# Patient Record
Sex: MALE | NOT HISPANIC OR LATINO | URBAN - METROPOLITAN AREA
[De-identification: names, ages, dates, MRNs, and addresses within clinical notes are randomized per-mention and may not be internally consistent; named-entity substitution may affect disease eponyms.]

---

## 2023-12-08 PROCEDURE — 99284 EMERGENCY DEPT VISIT MOD MDM: CPT | Mod: 25

## 2023-12-08 PROCEDURE — 99283 EMERGENCY DEPT VISIT LOW MDM: CPT | Performed by: EMERGENCY MEDICINE

## 2023-12-08 ASSESSMENT — COLUMBIA-SUICIDE SEVERITY RATING SCALE - C-SSRS
1. IN THE PAST MONTH, HAVE YOU WISHED YOU WERE DEAD OR WISHED YOU COULD GO TO SLEEP AND NOT WAKE UP?: NO
2. HAVE YOU ACTUALLY HAD ANY THOUGHTS OF KILLING YOURSELF?: NO
6. HAVE YOU EVER DONE ANYTHING, STARTED TO DO ANYTHING, OR PREPARED TO DO ANYTHING TO END YOUR LIFE?: NO

## 2023-12-09 ENCOUNTER — HOSPITAL ENCOUNTER (EMERGENCY)
Facility: HOSPITAL | Age: 43
Discharge: HOME | End: 2023-12-09
Attending: EMERGENCY MEDICINE

## 2023-12-09 VITALS
TEMPERATURE: 98 F | BODY MASS INDEX: 49.5 KG/M2 | HEIGHT: 66 IN | RESPIRATION RATE: 18 BRPM | WEIGHT: 308 LBS | HEART RATE: 81 BPM | DIASTOLIC BLOOD PRESSURE: 98 MMHG | OXYGEN SATURATION: 97 % | SYSTOLIC BLOOD PRESSURE: 159 MMHG

## 2023-12-09 DIAGNOSIS — J45.21 MILD INTERMITTENT ASTHMA WITH ACUTE EXACERBATION (HHS-HCC): Primary | ICD-10-CM

## 2023-12-09 PROCEDURE — 2500000002 HC RX 250 W HCPCS SELF ADMINISTERED DRUGS (ALT 637 FOR MEDICARE OP, ALT 636 FOR OP/ED)

## 2023-12-09 PROCEDURE — 94640 AIRWAY INHALATION TREATMENT: CPT

## 2023-12-09 PROCEDURE — 2500000004 HC RX 250 GENERAL PHARMACY W/ HCPCS (ALT 636 FOR OP/ED)

## 2023-12-09 RX ORDER — PREDNISONE 50 MG/1
50 TABLET ORAL DAILY
Qty: 5 TABLET | Refills: 0 | Status: SHIPPED | OUTPATIENT
Start: 2023-12-09 | End: 2023-12-14

## 2023-12-09 RX ORDER — BENZONATATE 100 MG/1
100 CAPSULE ORAL 3 TIMES DAILY PRN
Qty: 20 CAPSULE | Refills: 0 | Status: SHIPPED | OUTPATIENT
Start: 2023-12-09 | End: 2023-12-09 | Stop reason: SDUPTHER

## 2023-12-09 RX ORDER — BENZONATATE 100 MG/1
100 CAPSULE ORAL 3 TIMES DAILY PRN
Qty: 20 CAPSULE | Refills: 0 | Status: SHIPPED | OUTPATIENT
Start: 2023-12-09 | End: 2023-12-16

## 2023-12-09 RX ORDER — HYDROCODONE POLISTIREX AND CHLORPHENIRAMINE POLISTIREX 10; 8 MG/5ML; MG/5ML
5 SUSPENSION, EXTENDED RELEASE ORAL ONCE
Status: DISCONTINUED | OUTPATIENT
Start: 2023-12-09 | End: 2023-12-09

## 2023-12-09 RX ORDER — HYDROCODONE BITARTRATE AND HOMATROPINE METHYLBROMIDE ORAL SOLUTION 5; 1.5 MG/5ML; MG/5ML
5 LIQUID ORAL ONCE
Status: DISCONTINUED | OUTPATIENT
Start: 2023-12-09 | End: 2023-12-09 | Stop reason: HOSPADM

## 2023-12-09 RX ORDER — PREDNISONE 50 MG/1
50 TABLET ORAL DAILY
Qty: 5 TABLET | Refills: 0 | Status: SHIPPED | OUTPATIENT
Start: 2023-12-09 | End: 2023-12-09 | Stop reason: SDUPTHER

## 2023-12-09 RX ORDER — IPRATROPIUM BROMIDE AND ALBUTEROL SULFATE 2.5; .5 MG/3ML; MG/3ML
3 SOLUTION RESPIRATORY (INHALATION) EVERY 20 MIN
Status: DISPENSED | OUTPATIENT
Start: 2023-12-09 | End: 2023-12-09

## 2023-12-09 RX ADMIN — IPRATROPIUM BROMIDE AND ALBUTEROL SULFATE 3 ML: 2.5; .5 SOLUTION RESPIRATORY (INHALATION) at 01:46

## 2023-12-09 RX ADMIN — PREDNISONE 50 MG: 20 TABLET ORAL at 01:37

## 2023-12-09 RX ADMIN — IPRATROPIUM BROMIDE AND ALBUTEROL SULFATE 3 ML: 2.5; .5 SOLUTION RESPIRATORY (INHALATION) at 01:37

## 2023-12-09 NOTE — ED PROVIDER NOTES
CC: Asthma Exacerbation     HPI: Jaren Boyd is an 43 y.o. male with history including asthma and COPD presenting to the emergency department for asthma exacerbation.  Patient reports having a cough that is developed over the last couple days.  He notes that he is from out of town but is here taking care of family.  He notes a history of asthma and COPD.  He notes he has been taking Proventil and an albuterol inhaler with every couple hours at home.  Patient mostly concerned about the cough.  Denies fevers and chills.  He notes that tussinex works best for his cough.  Denies chest pain.    Limitations to History: None  Additional History Obtained from: [facility] was reviewed    PMHx/PSHx:  Per HPI.   - has no past medical history on file.  - has no past surgical history on file.    Social History:  - Tobacco:  has no history on file for tobacco use.   - Alcohol:  has no history on file for alcohol use.   - Drugs:  has no history on file for drug use.     Medications: Reviewed in EMR.     Allergies:  Patient has no known allergies.    ???????????????????????????????????????????????????????????????  Triage Vitals:  T 36.7 °C (98 °F)  HR 84  BP (!) 161/96  RR 18  O2 99 %      Physical Exam  Constitutional:       General: He is not in acute distress.  HENT:      Head: Normocephalic.   Cardiovascular:      Rate and Rhythm: Normal rate.   Pulmonary:      Effort: Pulmonary effort is normal. No respiratory distress.      Breath sounds: Wheezing present.   Abdominal:      General: Abdomen is flat.   Musculoskeletal:         General: Normal range of motion.   Skin:     General: Skin is dry.   Neurological:      Mental Status: He is alert and oriented to person, place, and time. Mental status is at baseline.       ???????????????????????????????????????????????????????????????      ED Course/Medical Decision Making:    Diagnoses as of 12/09/23 2042   Mild intermittent asthma with acute exacerbation        Patient presents  with exacerbation of their known obstructive lung disease. Lung exam does not concern me for a focal process such as pneumonia.  Patient's lung exam and presentation is consistent with obstructive lung disease. Low concern for Pulmonary embolism or ACS.  Chest xray was ordered to evaluate for pneumonia given persistent cough. CT scan and further lab evaluation for this was not pursued at this time.  Patient does not present in respiratory distress requiring any sort of positive pressure ventilation.  Patient was given   Medications   ipratropium-albuteroL (Duo-Neb) 0.5-2.5 mg/3 mL nebulizer solution 3 mL (3 mL nebulization Not Given 12/9/23 0210)   predniSONE (Deltasone) tablet 50 mg (50 mg oral Given 12/9/23 0137)    with improvement in their symptoms.   Patient was signed out to incoming provider pending reevlaluation and disposition.    External records reviewed: recent inpatient, clinic, and prior ED notes  Diagnostic imaging independently reviewed/interpreted by me (as reflected in MDM) includes:[facility]  Social Determinants Affecting Care:   Discussion of management with other providers: ED attendign  Prescription Drug Consideration: duoneb, prednisone, tessalone pearles  Escalation of Care: na    Impression:   Asthma exacerbation    Disposition: signed out      Procedures ? [employer] last updated 12/9/2023 1:08 AM        Jackie Ferro MD  Resident  12/09/23 2045

## 2023-12-09 NOTE — ED TRIAGE NOTES
Patient ambulatory to ED with complaint of cough x4 days. Hx of asthma, has been using his proventil and albuterol inhaler with no relief. Patient denies dyspnea/chest pain. Not actively coughing in triage. Speaking in full complete sentences with no audible wheezing.

## 2023-12-09 NOTE — DISCHARGE INSTRUCTIONS
You are seen today for an asthma exacerbation.  Please take albuterol every 4 hours.  Please take prednisone for asthma exacerbation.  Please return to the ED if worsening shortness of breath, wheezing that is not controlled with home albuterol inhaler, loss of consciousness or difficulty ambulating because of the shortness of breath